# Patient Record
Sex: MALE | URBAN - METROPOLITAN AREA
[De-identification: names, ages, dates, MRNs, and addresses within clinical notes are randomized per-mention and may not be internally consistent; named-entity substitution may affect disease eponyms.]

---

## 2018-08-07 ENCOUNTER — APPOINTMENT (RX ONLY)
Dept: URBAN - METROPOLITAN AREA CLINIC 12 | Facility: CLINIC | Age: 38
Setting detail: DERMATOLOGY
End: 2018-08-07

## 2018-08-07 DIAGNOSIS — L90.5 SCAR CONDITIONS AND FIBROSIS OF SKIN: ICD-10-CM

## 2018-08-07 DIAGNOSIS — Z41.1 ENCOUNTER FOR COSMETIC SURGERY: ICD-10-CM

## 2018-08-07 PROCEDURE — ? PHOTOS OBTAINED

## 2018-08-07 PROCEDURE — 99203 OFFICE O/P NEW LOW 30 MIN: CPT

## 2018-08-07 PROCEDURE — ? PATIENT SPECIFIC COUNSELING

## 2018-08-07 PROCEDURE — ? COUNSELING - SCAR

## 2018-08-07 ASSESSMENT — LOCATION ZONE DERM: LOCATION ZONE: FACE

## 2018-08-07 ASSESSMENT — LOCATION SIMPLE DESCRIPTION DERM: LOCATION SIMPLE: LEFT FOREHEAD

## 2018-08-07 ASSESSMENT — LOCATION DETAILED DESCRIPTION DERM: LOCATION DETAILED: LEFT INFERIOR FOREHEAD

## 2018-08-07 NOTE — HPI: SCAR (COMPLEX)
How Severe Is The Scarring?: moderate
Is This A New Presentation, Or A Follow-Up?: Scar
Additional History: Referred by Alan French.

## 2018-09-11 ENCOUNTER — APPOINTMENT (RX ONLY)
Dept: URBAN - METROPOLITAN AREA CLINIC 12 | Facility: CLINIC | Age: 38
Setting detail: DERMATOLOGY
End: 2018-09-11

## 2018-09-11 DIAGNOSIS — L90.5 SCAR CONDITIONS AND FIBROSIS OF SKIN: ICD-10-CM

## 2018-09-11 PROCEDURE — 99213 OFFICE O/P EST LOW 20 MIN: CPT

## 2018-09-11 PROCEDURE — ? PATIENT SPECIFIC COUNSELING

## 2018-09-11 PROCEDURE — ? COUNSELING - SCAR

## 2018-09-11 ASSESSMENT — LOCATION ZONE DERM: LOCATION ZONE: FACE

## 2018-09-11 ASSESSMENT — LOCATION SIMPLE DESCRIPTION DERM: LOCATION SIMPLE: LEFT FOREHEAD

## 2018-09-11 ASSESSMENT — LOCATION DETAILED DESCRIPTION DERM: LOCATION DETAILED: LEFT INFERIOR FOREHEAD

## 2018-09-11 NOTE — PROCEDURE: PATIENT SPECIFIC COUNSELING
Detail Level: Zone
Other (Free Text): Patient presents for scar follow up. Patient states that the siligen and massaging has helped. Dr. Miles examined the patient and explained that the scar look lot better, however Dr. Miles reined patient that he is only 2 months in, so to continue massaging and applying the siligen ice a day, Dr. Miles also mentioned to protecting his skin wearing a hat and SPF. ROSELYN Miles also mentioned that if there’d ness in his scar does not fade, he will start laser treatments.  Patient verbalized understanding.

## 2019-12-31 NOTE — PROCEDURE: PATIENT SPECIFIC COUNSELING
Detail Level: Zone
Other (Free Text): Patient presents with  scar on his left lower forehead from an accident with a garden tool about 1 month ago. Patient states he went to urgent care and the glued his skin together, but it fell off after. Few days and now the scar is thick and raised. Dr. Miles went over a few options that are non invasive. Dr. Miles  educated patient on siligen with spf, Dr. Miles also recommended patient start massaging the area 2 min in the morning and 2 min at night, Dr. Miles explained that this will help soften the scar. Dr. Miles educated patient on sun protection, wearing a sweat band or a hat. Dr. Miles suggested starting with these things  first and to follow up in 1 month. If after that, if there is no improvement, Dr. Miles mentioned he will inject a steroid to help melt it down a lot faster. \\n\\nPatients photos we taken.
(4) rarely moist

## 2024-01-24 ENCOUNTER — LAB OUTSIDE AN ENCOUNTER (OUTPATIENT)
Dept: URBAN - METROPOLITAN AREA CLINIC 109 | Facility: CLINIC | Age: 44
End: 2024-01-24

## 2024-01-24 ENCOUNTER — DASHBOARD ENCOUNTERS (OUTPATIENT)
Age: 44
End: 2024-01-24

## 2024-01-24 ENCOUNTER — OFFICE VISIT (OUTPATIENT)
Dept: URBAN - METROPOLITAN AREA CLINIC 109 | Facility: CLINIC | Age: 44
End: 2024-01-24
Payer: COMMERCIAL

## 2024-01-24 VITALS
TEMPERATURE: 97.9 F | HEIGHT: 74 IN | DIASTOLIC BLOOD PRESSURE: 82 MMHG | BODY MASS INDEX: 27.95 KG/M2 | WEIGHT: 217.8 LBS | SYSTOLIC BLOOD PRESSURE: 123 MMHG | HEART RATE: 69 BPM

## 2024-01-24 DIAGNOSIS — K62.5 RECTAL BLEEDING: ICD-10-CM

## 2024-01-24 PROCEDURE — 99204 OFFICE O/P NEW MOD 45 MIN: CPT | Performed by: INTERNAL MEDICINE

## 2024-01-24 RX ORDER — BISACODYL 5 MG
3 TABLETS TABLET, DELAYED RELEASE (ENTERIC COATED) ORAL
Qty: 3 | Refills: 0 | OUTPATIENT
Start: 2024-01-24 | End: 2024-01-25

## 2024-01-24 RX ORDER — POLYETHYLENE GLYCOL 3350, SODIUM SULFATE ANHYDROUS, SODIUM BICARBONATE, SODIUM CHLORIDE, POTASSIUM CHLORIDE 236; 22.74; 6.74; 5.86; 2.97 G/4L; G/4L; G/4L; G/4L; G/4L
4000 ML POWDER, FOR SOLUTION ORAL ONCE
Qty: 1 | Refills: 0 | OUTPATIENT
Start: 2024-01-24 | End: 2024-01-25

## 2024-01-24 NOTE — HPI-TODAY'S VISIT:
Patient presents today for evaluation of rectal bleeding. Reports painless BRBPR on toilet paper and mixed with stool - 2 episodes in the last couple weeks. No previous similar sxs. No prior colonoscopy. No FHX of colon cancer. Denies melena, N/V, abdominal pain, unintentional weight loss, or changes in bowel habits. Reports 1 formed BM/day. No history of anemia or UGI symptoms. No heart, lung, or renal conditions. No blood thinners.

## 2024-02-06 ENCOUNTER — COLON (OUTPATIENT)
Dept: URBAN - METROPOLITAN AREA SURGERY CENTER 23 | Facility: SURGERY CENTER | Age: 44
End: 2024-02-06
Payer: COMMERCIAL

## 2024-02-06 DIAGNOSIS — K92.1 HEMATOCHEZIA: ICD-10-CM

## 2024-02-06 PROCEDURE — 45378 DIAGNOSTIC COLONOSCOPY: CPT | Performed by: INTERNAL MEDICINE

## 2025-07-25 ENCOUNTER — OFFICE VISIT (OUTPATIENT)
Dept: URBAN - METROPOLITAN AREA CLINIC 109 | Facility: CLINIC | Age: 45
End: 2025-07-25
Payer: COMMERCIAL

## 2025-07-25 DIAGNOSIS — K42.9 UMBILICAL HERNIA WITHOUT OBSTRUCTION AND WITHOUT GANGRENE: ICD-10-CM

## 2025-07-25 DIAGNOSIS — K43.9 VENTRAL HERNIA WITHOUT OBSTRUCTION OR GANGRENE: ICD-10-CM

## 2025-07-25 DIAGNOSIS — K64.8 INTERNAL HEMORRHOIDS: ICD-10-CM

## 2025-07-25 PROCEDURE — 99214 OFFICE O/P EST MOD 30 MIN: CPT | Performed by: INTERNAL MEDICINE

## 2025-07-25 RX ORDER — ETODOLAC 400 MG/1
TAKE 1 TABLET BY MOUTH TWICE A DAY TABLET, FILM COATED ORAL
Qty: 180 EACH | Refills: 0 | Status: ACTIVE | COMMUNITY

## 2025-07-25 NOTE — PHYSICAL EXAM CHEST:
no masses present, breathing is unlabored without accessory muscle use, clear to auscultation bilaterally

## 2025-07-25 NOTE — HPI-TODAY'S VISIT:
45-year-old male presenting for evaluation of abdominal pain and bulging.  He noticed while working out there was a bulge around his bellybutton.  He went to [facility] urgent care, underwent a CT scan that is available for review today.  The CT revealed umbilical hernia with small bowel herniation loop without incarceration or obstruction. Prominent colonic mucosal veins. Tiny kidney cysts.  He also was having urinary symptoms at the time and was treated for nonbacterial urethritis.  Patient denies significant other GI complaints today.  He was seen last year for hematochezia underwent colonoscopy on 2/6/2024 revealing diverticulosis and internal hemorrhoids.  He noticed some blood in the stool a few weeks ago however this has been rare. No changes in bowel habits, BM once daily, no n/v, unintentional weight loss.

## 2025-07-25 NOTE — PHYSICAL EXAM GASTROINTESTINAL
Abdomen , small ventral/umbilical hernia without tenderness, soft, nontender, nondistended , no guarding or rigidity , no masses palpable , normal bowel sounds , Rectal  deferred

## 2025-07-30 ENCOUNTER — OFFICE VISIT (OUTPATIENT)
Dept: URBAN - METROPOLITAN AREA CLINIC 109 | Facility: CLINIC | Age: 45
End: 2025-07-30